# Patient Record
Sex: MALE | Race: OTHER | NOT HISPANIC OR LATINO | URBAN - METROPOLITAN AREA
[De-identification: names, ages, dates, MRNs, and addresses within clinical notes are randomized per-mention and may not be internally consistent; named-entity substitution may affect disease eponyms.]

---

## 2018-05-30 ENCOUNTER — EMERGENCY (EMERGENCY)
Facility: HOSPITAL | Age: 43
LOS: 1 days | Discharge: HOME | End: 2018-05-30
Attending: EMERGENCY MEDICINE | Admitting: EMERGENCY MEDICINE
Payer: SELF-PAY

## 2018-05-30 VITALS
OXYGEN SATURATION: 97 % | RESPIRATION RATE: 18 BRPM | DIASTOLIC BLOOD PRESSURE: 85 MMHG | HEART RATE: 79 BPM | TEMPERATURE: 99 F | SYSTOLIC BLOOD PRESSURE: 126 MMHG

## 2018-05-30 VITALS
SYSTOLIC BLOOD PRESSURE: 127 MMHG | RESPIRATION RATE: 16 BRPM | DIASTOLIC BLOOD PRESSURE: 84 MMHG | OXYGEN SATURATION: 98 % | HEART RATE: 51 BPM

## 2018-05-30 DIAGNOSIS — R07.89 OTHER CHEST PAIN: ICD-10-CM

## 2018-05-30 DIAGNOSIS — R07.9 CHEST PAIN, UNSPECIFIED: ICD-10-CM

## 2018-05-30 LAB
ALBUMIN SERPL ELPH-MCNC: 3.9 G/DL — SIGNIFICANT CHANGE UP (ref 3.4–5)
ALP SERPL-CCNC: 71 U/L — SIGNIFICANT CHANGE UP (ref 40–120)
ALT FLD-CCNC: 39 U/L — SIGNIFICANT CHANGE UP (ref 12–42)
ANION GAP SERPL CALC-SCNC: 9 MMOL/L — SIGNIFICANT CHANGE UP (ref 9–16)
AST SERPL-CCNC: 32 U/L — SIGNIFICANT CHANGE UP (ref 15–37)
BILIRUB SERPL-MCNC: 0.3 MG/DL — SIGNIFICANT CHANGE UP (ref 0.2–1.2)
BUN SERPL-MCNC: 11 MG/DL — SIGNIFICANT CHANGE UP (ref 7–23)
CALCIUM SERPL-MCNC: 9.3 MG/DL — SIGNIFICANT CHANGE UP (ref 8.5–10.5)
CHLORIDE SERPL-SCNC: 104 MMOL/L — SIGNIFICANT CHANGE UP (ref 96–108)
CO2 SERPL-SCNC: 27 MMOL/L — SIGNIFICANT CHANGE UP (ref 22–31)
CREAT SERPL-MCNC: 0.89 MG/DL — SIGNIFICANT CHANGE UP (ref 0.5–1.3)
GLUCOSE SERPL-MCNC: 105 MG/DL — HIGH (ref 70–99)
HCT VFR BLD CALC: 44.9 % — SIGNIFICANT CHANGE UP (ref 39–50)
HGB BLD-MCNC: 15.5 G/DL — SIGNIFICANT CHANGE UP (ref 13–17)
MCHC RBC-ENTMCNC: 30.5 PG — SIGNIFICANT CHANGE UP (ref 27–34)
MCHC RBC-ENTMCNC: 34.5 G/DL — SIGNIFICANT CHANGE UP (ref 32–36)
MCV RBC AUTO: 88.2 FL — SIGNIFICANT CHANGE UP (ref 80–100)
PLATELET # BLD AUTO: 258 K/UL — SIGNIFICANT CHANGE UP (ref 150–400)
POTASSIUM SERPL-MCNC: 4.1 MMOL/L — SIGNIFICANT CHANGE UP (ref 3.5–5.3)
POTASSIUM SERPL-SCNC: 4.1 MMOL/L — SIGNIFICANT CHANGE UP (ref 3.5–5.3)
PROT SERPL-MCNC: 7.9 G/DL — SIGNIFICANT CHANGE UP (ref 6.4–8.2)
RBC # BLD: 5.09 M/UL — SIGNIFICANT CHANGE UP (ref 4.2–5.8)
RBC # FLD: 12 % — SIGNIFICANT CHANGE UP (ref 10.3–16.9)
SODIUM SERPL-SCNC: 140 MMOL/L — SIGNIFICANT CHANGE UP (ref 132–145)
TROPONIN I SERPL-MCNC: <0.017 NG/ML — LOW (ref 0.02–0.06)
TROPONIN I SERPL-MCNC: <0.017 NG/ML — LOW (ref 0.02–0.06)
WBC # BLD: 8.8 K/UL — SIGNIFICANT CHANGE UP (ref 3.8–10.5)
WBC # FLD AUTO: 8.8 K/UL — SIGNIFICANT CHANGE UP (ref 3.8–10.5)

## 2018-05-30 PROCEDURE — 99218: CPT | Mod: 25

## 2018-05-30 PROCEDURE — 71046 X-RAY EXAM CHEST 2 VIEWS: CPT | Mod: 26

## 2018-05-30 PROCEDURE — 93010 ELECTROCARDIOGRAM REPORT: CPT

## 2018-05-30 RX ORDER — ASPIRIN/CALCIUM CARB/MAGNESIUM 324 MG
325 TABLET ORAL ONCE
Qty: 0 | Refills: 0 | Status: COMPLETED | OUTPATIENT
Start: 2018-05-30 | End: 2018-05-30

## 2018-05-30 RX ADMIN — Medication 325 MILLIGRAM(S): at 19:26

## 2018-05-30 NOTE — ED CDU PROVIDER INITIAL DAY NOTE - DIAGNOSTIC INTERPRETATION
chest x-ray, 2 views: Cardiac silhouette, mediastinal and hilar contours wnl, no acute consolidation, infiltrate, effusion, or PTX. No bony abnormalities noted

## 2018-05-30 NOTE — ED PROVIDER NOTE - OBJECTIVE STATEMENT
43 y/o M presents to the ED with constant chest pain, radiating to left upper arm, since 3-4 days ago that started after he moved furniture. Describes pain as dull throbbing, 2/10. No exacerbating or alleviating factors. He states he normally exercises with no chest pain. Took a baby aspirin yesterday. Denies SOB, nausea, vomiting, abd pain, or diaphoresis. He states he is under a lot of stress recently.

## 2018-05-30 NOTE — ED CDU PROVIDER INITIAL DAY NOTE - OBJECTIVE STATEMENT
41 y/o M presents to the ED with constant chest pain, radiating to left upper arm, since 3-4 days ago that started after he moved furniture. Describes pain as dull throbbing, 2/10. No exacerbating or alleviating factors. He states he normally exercises with no chest pain. Took a baby aspirin yesterday. Denies SOB, nausea, vomiting, abd pain, or diaphoresis. He states he is under a lot of stress recently.

## 2018-05-30 NOTE — ED ADULT NURSE NOTE - NS ED NOTE  TALK SOMEONE YN
CHIEF COMPLAINT:  Chest Tightness    SUBJECTIVE: Patient seen and examined with Dr. Adan, Blade, Pardeep and James on the Family Medicine Teaching Service.  Agree with history, physical, labs and plan which were reviewed in detail.    64 YO M with PMHx of HTN, presenting with "chest tightness".      Patient reports that he was shoveling snow yesterday with no discomfort, today he came home and took a nap.  When he woke up, he reports feeling strange and felt some chest tightness that felt unusual to him. Patient cannot specify how long the sensation lasted.  Graded as 1/10  cannot tell me what increases or decreases this discomfort.  Cannot tell me how long the sensation lasted. + associated mild SOB   No radiation , no palpitations and no diaphoresis  .  He recently had seen Dr. Seo for stress test that was abnormal last week ( persantine stress) and was supposed to see Dr. Seo on Monday to discuss the need for angio with Cath and stent placement.  Patient was advised to come to the ED if he had felt anything abnormal. Patient denies shortness of breath but does report URI symptoms since new years which have been improving.  Denies palpitations, jaw or arm pain/numbness, abdominal pain, headache.      In the ED, patient received ASA 325mg x1, Nitro Ointment x1.     2/12 - Patient states he is feeling much better, tightness has resolved.  Patient is comfortable, NAD.    2/13  Patient is s/p catheterization.  Doing well, minimal discomfort in right groin. No excessive bleeding. Patient did not receive any cardiac stents during the procedure.         Vital Signs Last 24 Hrs  T(C): 36.2, Max: 36.6 (02-12 @ 20:14)  T(F): 97.2, Max: 97.9 (02-12 @ 20:14)  HR: 65 (65 - 72)  BP: 124/80 (109/67 - 128/69)  BP(mean): --  RR: 18 (16 - 18)  SpO2: 96% (95% - 100%)    I&O's Summary      CAPILLARY BLOOD GLUCOSE        MEDICATIONS:  MEDICATIONS  (STANDING):  triamterene 37.5 mG/hydrochlorothiazide 25 mG Tablet 1Tablet(s) Oral daily  metoprolol 100milliGRAM(s) Oral two times a day  amLODIPine   Tablet 5milliGRAM(s) Oral daily  aspirin 325milliGRAM(s) Oral daily  clopidogrel Tablet 75milliGRAM(s) Oral daily  atorvastatin 80milliGRAM(s) Oral at bedtime      LABS: All Labs Reviewed:                        17.2   8.2   )-----------( 192      ( 13 Feb 2017 06:39 )             49.7     13 Feb 2017 06:39    141    |  104    |  15     ----------------------------<  92     4.2     |  31     |  0.98     Ca    8.9        13 Feb 2017 06:39  Phos  2.6       12 Feb 2017 08:21  Mg     2.3       12 Feb 2017 08:21    TPro  7.4    /  Alb  3.9    /  TBili  1.0    /  DBili  x      /  AST  49     /  ALT  61     /  AlkPhos  56     12 Feb 2017 08:21    PT/INR - ( 11 Feb 2017 21:50 )   PT: 13.0 sec;   INR: 1.18 ratio         PTT - ( 11 Feb 2017 21:50 )  PTT:32.5 sec  CARDIAC MARKERS ( 12 Feb 2017 08:21 )  <0.015 ng/mL / x     / 465 U/L / x     / x      CARDIAC MARKERS ( 12 Feb 2017 01:24 )  <0.015 ng/mL / x     / 633 U/L / x     / x      CARDIAC MARKERS ( 11 Feb 2017 21:50 )  <0.015 ng/mL / x     / 767 U/L / x     / x         Impression     Diagnostic Conclusions     Single vessel CAD as described    LCx: Diffuse irregularity.There is a small OM1 with a 75% ostial stenosis     Recommendations     Aggressive medical management of coronary artery disease and its   underlying risk factors.   Manage with medical therapy.    Estimated Blood Loss:7ml.      Blood Culture:     RADIOLOGY/EKG:
CHIEF COMPLAINT: Chest Tightness    SUBJECTIVE: Patient seen and examined with Dr. Adan and James on the Family Medicine Teaching Service.  Agree with history, physical, labs and plan which were reviewed in detail.     62 YO M with PMHx of HTN, presenting with "chest tightness".      Patient reports that he was shoveling snow yesterday with no discomfort, today he came home and took a nap.  When he woke up, he reports feeling strange and felt some chest tightness that felt unusual to him. Patient cannot specify how long the sensation lasted.  Graded as 1/10  cannot tell me what increases or decreases this discomfort.  Cannot tell me how long the sensation lasted. + associated mild SOB   No radiation , no palpitations and no diaphoresis  .  He recently had seen Dr. Seo for stress test that was abnormal last week ( persantine stress) and was supposed to see Dr. Seo on Monday to discuss the need for angio with Cath and stent placement.  Patient was advised to come to the ED if he had felt anything abnormal. Patient denies shortness of breath but does report URI symptoms since new years which have been improving.  Denies palpitations, jaw or arm pain/numbness, abdominal pain, headache.      In the ED, patient received ASA 325mg x1, Nitro Ointment x1.     2/12 - Patient states he is feeling much better, tightness has resolved.  Patient is comfortable, NAD.       Vital Signs Last 24 Hrs  T(C): 36.8, Max: 36.8 (02-12 @ 03:58)  T(F): 98.2, Max: 98.2 (02-12 @ 03:58)  HR: 71 (67 - 81)  BP: 111/76 (107/77 - 145/97)  BP(mean): --  RR: 17 (16 - 18)  SpO2: 95% (95% - 100%)    I&O's Summary      CAPILLARY BLOOD GLUCOSE        MEDICATIONS:  MEDICATIONS  (STANDING):  triamterene 37.5 mG/hydrochlorothiazide 25 mG Tablet 1Tablet(s) Oral daily  metoprolol 100milliGRAM(s) Oral two times a day  amLODIPine   Tablet 5milliGRAM(s) Oral daily  aspirin 325milliGRAM(s) Oral daily  clopidogrel Tablet 75milliGRAM(s) Oral daily  atorvastatin 80milliGRAM(s) Oral at bedtime      LABS: All Labs Reviewed:                        16.7   9.3   )-----------( 222      ( 11 Feb 2017 21:50 )             47.1     12 Feb 2017 08:21    142    |  104    |  14     ----------------------------<  84     3.5     |  29     |  1.06     Ca    8.9        12 Feb 2017 08:21  Phos  2.6       12 Feb 2017 08:21  Mg     2.3       12 Feb 2017 08:21    TPro  7.4    /  Alb  3.9    /  TBili  1.0    /  DBili  x      /  AST  49     /  ALT  61     /  AlkPhos  56     12 Feb 2017 08:21    PT/INR - ( 11 Feb 2017 21:50 )   PT: 13.0 sec;   INR: 1.18 ratio         PTT - ( 11 Feb 2017 21:50 )  PTT:32.5 sec  CARDIAC MARKERS ( 12 Feb 2017 08:21 )  <0.015 ng/mL / x     / 465 U/L / x     / x      CARDIAC MARKERS ( 12 Feb 2017 01:24 )  <0.015 ng/mL / x     / 633 U/L / x     / x      CARDIAC MARKERS ( 11 Feb 2017 21:50 )  <0.015 ng/mL / x     / 767 U/L / x     / x          Blood Culture:     RADIOLOGY/EKG:
Chief Complaint:    HPI:  62 y/o M with PMHx of HTN, presenting with "chest tightness". Patient reports that he was shoveling snow Friday with no discomfort.  When he woke up, he reports feeling strange and felt some chest tightness that felt unusual to him. Patient cannot specify how long the sensation lasted.  Graded as 1/10  cannot tell me what increases or decreases this discomfort.  Cannot tell me how long the sensation lasted. + associated mild SOB   No radiation , no palpitations and no diaphoresis  He recently had seen Dr. Seo for stress test that was abnormal last week ( persantine stress) and was supposed to see Dr. Seo on Monday to discuss the need for angio with Cath and stent placement.  Patient was advised to come to the ED if he had felt anything abnormal. Patient denies shortness of breath but does report URI symptoms since new years which have been improving.  Denies palpitations, jaw or arm pain/numbness, abdominal pain, headache.      In the ED, patient received ASA 325mg x1, Nitro Ointment x1.     pt seen and examined bedside. offers no complaints and states he feels well.       REVIEW OF SYSTEMS:    CONSTITUTIONAL: No weakness, fevers or chills  EYES/ENT: No visual changes;  No vertigo or throat pain   NECK: No pain or stiffness  RESPIRATORY: No cough, wheezing, hemoptysis; No shortness of breath  CARDIOVASCULAR: No chest pain or palpitations  GASTROINTESTINAL: No abdominal or epigastric pain. No nausea, vomiting, or hematemesis; No diarrhea or constipation. No melena or hematochezia.  GENITOURINARY: No dysuria, frequency or hematuria  NEUROLOGICAL: No numbness or weakness  SKIN: No itching, burning, rashes, or lesions   All other review of systems is negative unless indicated above    Vital Signs Last 24 Hrs  T(C): 36.8, Max: 36.8 (02-12 @ 03:58)  T(F): 98.2, Max: 98.2 (02-12 @ 03:58)  HR: 71 (67 - 81)  BP: 111/76 (107/77 - 145/97)  BP(mean): --  RR: 17 (16 - 18)  SpO2: 95% (95% - 100%)      PHYSICAL EXAM:    Constitutional: NAD, awake and alert, well-developed  HEENT: PERR, EOMI, Normal Hearing, MMM  Neck: Soft and supple, No LAD, No JVD  Respiratory: Breath sounds are clear bilaterally, No wheezing, rales or rhonchi  Cardiovascular: S1 and S2, regular rate and rhythm, no Murmurs, gallops or rubs  Gastrointestinal: Bowel Sounds present, soft, nontender, nondistended, no guarding, no rebound  Extremities: No peripheral edema  Vascular: 2+ peripheral pulses  Neurological: A/O x 3, no focal deficits  Musculoskeletal: 5/5 strength b/l upper and lower extremities  Skin: No rashes    Medications:  MEDICATIONS  (STANDING):  triamterene 37.5 mG/hydrochlorothiazide 25 mG Tablet 1Tablet(s) Oral daily  metoprolol 100milliGRAM(s) Oral two times a day  amLODIPine   Tablet 5milliGRAM(s) Oral daily  aspirin 325milliGRAM(s) Oral daily  clopidogrel Tablet 75milliGRAM(s) Oral daily  atorvastatin 80milliGRAM(s) Oral at bedtime    MEDICATIONS  (PRN):      Labs: All Labs Reviewed:                        16.7   9.3   )-----------( 222      ( 11 Feb 2017 21:50 )             47.1     12 Feb 2017 08:21    142    |  104    |  14     ----------------------------<  84     3.5     |  29     |  1.06     Ca    8.9        12 Feb 2017 08:21  Phos  2.6       12 Feb 2017 08:21  Mg     2.3       12 Feb 2017 08:21    TPro  7.4    /  Alb  3.9    /  TBili  1.0    /  DBili  x      /  AST  49     /  ALT  61     /  AlkPhos  56     12 Feb 2017 08:21    PT/INR - ( 11 Feb 2017 21:50 )   PT: 13.0 sec;   INR: 1.18 ratio         PTT - ( 11 Feb 2017 21:50 )  PTT:32.5 sec  CARDIAC MARKERS ( 12 Feb 2017 08:21 )  <0.015 ng/mL / x     / 465 U/L / x     / x      CARDIAC MARKERS ( 12 Feb 2017 01:24 )  <0.015 ng/mL / x     / 633 U/L / x     / x      CARDIAC MARKERS ( 11 Feb 2017 21:50 )  <0.015 ng/mL / x     / 767 U/L / x     / x
Patient is a 63y old  Male who presents with a chief complaint of chest tightness while shoveling snow (2017 05:25)      HPI:  62 YO M with PMHx of HTN, presenting with "chest tightness".      Patient reports that he was shoveling snow the day before with no discomfort, and he came home and took a nap.  When he woke up, he reports feeling strange and felt some chest tightness that felt unusual to him. Patient cannot specify how long the sensation lasted.  Graded as 1/10  cannot tell me what increases or decreases this discomfort.  Cannot tell me how long the sensation lasted. + associated mild SOB   No radiation , no palpitations and no diaphoresis  .  He recently had seen Dr. Seo for stress test that was abnormal last week ( persantine stress) and was supposed to see Dr. Seo on Monday to discuss the need for angio with Cath and stent placement.  Patient was advised to come to the ED if he had felt anything abnormal. Patient denies shortness of breath but does report URI symptoms since new years which have been improving.  Denies palpitations, jaw or arm pain/numbness, abdominal pain, headache.      In the ED, patient received ASA 325mg x1, Nitro Ointment x1.      - Pt denies any recurrence of his symptoms.      PAST MEDICAL & SURGICAL HISTORY:  Diverticulitis  HTN (hypertension)  History of inguinal hernia repair  S/P partial colectomy: for diverticulitis  History of cholecystectomy          MEDICATIONS  (STANDING):  triamterene 37.5 mG/hydrochlorothiazide 25 mG Tablet 1Tablet(s) Oral daily  metoprolol 100milliGRAM(s) Oral two times a day  amLODIPine   Tablet 5milliGRAM(s) Oral daily  aspirin 325milliGRAM(s) Oral daily  clopidogrel Tablet 75milliGRAM(s) Oral daily  atorvastatin 80milliGRAM(s) Oral at bedtime    MEDICATIONS  (PRN):      FAMILY HISTORY:  Family history of diabetes mellitus in father (Father)  Family history of cardiac disorder in father (Father):  age 58      SOCIAL HISTORY:  quit smoking many yrs ago    CIGARETTES:    ALCOHOL:    REVIEW OF SYSTEMS:  CONSTITUTIONAL:  No night sweats.  No fatigue, malaise, lethargy.  No fever or chills.  HEENT:  Eyes:  No visual changes.  No eye pain.  No eye discharge.    ENT:  No runny nose.  No epistaxis.  No sinus pain.  No sore throat.  No odynophagia.  No ear pain.  No congestion.  RESPIRATORY:  No cough.  No wheeze.  No hemoptysis.  No shortness of breath.  CARDIOVASCULAR:  No chest pains.  No palpitations. No shortness of breath, orthopnea or PND.  GASTROINTESTINAL:  No abdominal pain.  No nausea or vomiting.  No diarrhea or constipation.  No hematemesis.  No hematochezia.  No melena.  GENITOURINARY:  No urgency.  No frequency.  No dysuria.  No hematuria.  No obstructive symptoms.  No discharge.  No pain.  No significant abnormal bleeding.  MUSCULOSKELETAL:  No musculoskeletal pain.  No joint swelling.  No arthritis.  NEUROLOGICAL:  No tingling or numbness or weakness.  PSYCHIATRIC:  No confusion  SKIN:  No rashes.  No lesions.  No wounds.  ENDOCRINE:  No unexplained weight loss.  No polydipsia.  No polyuria.  No polyphagia.  HEMATOLOGIC:  No anemia.  No purpura.  No petechiae.  No prolonged or excessive bleeding.   ALLERGIC AND IMMUNOLOGIC:  No pruritus.  No swelling.         Vital Signs Last 24 Hrs  T(C): 36.2, Max: 36.8 (02-12 @ 03:58)  T(F): 97.2, Max: 98.2 (02-12 @ 03:58)  HR: 70 (67 - 81)  BP: 130/91 (107/77 - 145/97)  BP(mean): --  RR: 16 (16 - 18)  SpO2: 96% (96% - 100%)    PHYSICAL EXAM-    Constitutional: The patient appears to be normal, well developed, well nourished and alert and oriented to time, place and person. The patient does not appear acutely ill. The patient is alert.     Head: Head is normocephalic and atraumatic.      Neck: The patient's neck is supple without enlargement, has no palpable thyromegaly nor thyroid nodules and has no jugular venous distention. No audible carotid bruits. There are strong carotid pulses bilaterally. No JVD.     Cardiovascular: Regular rate and rhythm without S3, S4. No murmurs or rubs are appreciated.      Respiratory: The breath sounds in the left lung field are diminished through out. The breath sounds in the right lung field are diminished through out. The patient has no rales and no rhonchi. The patient has no wheezes.     Abdomen: Soft, nontender, nondistended with positive bowel sounds.      Extremity: No tenderness. There is no pitting edema, skin discoloration, clubbing and cyanosis.     Neurologic: The patient is alert and oriented.      Skin: No rash, no obvious lesions noted.      Psychiatric: The patient appears to be emotionally stable.      INTERPRETATION OF TELEMETRY:    ECG:  sinus rythm, L axis, no st t changes, normal intervals.  I&O's Detail      LABS:                        16.7   9.3   )-----------( 222      ( 2017 21:50 )             47.1     2017 08:21    142    |  104    |  14     ----------------------------<  84     3.5     |  29     |  1.06     Ca    8.9        2017 08:21  Phos  2.6       2017 08:21  Mg     2.3       2017 08:21    TPro  7.4    /  Alb  3.9    /  TBili  1.0    /  DBili  x      /  AST  49     /  ALT  61     /  AlkPhos  56     2017 08:21    CARDIAC MARKERS ( 2017 08:21 )  <0.015 ng/mL / x     / 465 U/L / x     / x      CARDIAC MARKERS ( 2017 01:24 )  <0.015 ng/mL / x     / 633 U/L / x     / x      CARDIAC MARKERS ( 2017 21:50 )  <0.015 ng/mL / x     / 767 U/L / x     / x          PT/INR - ( 2017 21:50 )   PT: 13.0 sec;   INR: 1.18 ratio         PTT - ( 2017 21:50 )  PTT:32.5 sec    I&O's Summary    BNPSerum Pro-Brain Natriuretic Peptide: 169 pg/mL ( @ 21:50)    RADIOLOGY & ADDITIONAL STUDIES:
No

## 2024-06-07 NOTE — ED CDU PROVIDER INITIAL DAY NOTE - SKIN, MLM
Results sent to patient portal    Tisha,    Urinalysis with some protein, let's repeat urinalysis at a nonfasting lab visit, all other parameters without concerns.     Lipids: Cholesterol and triglycerides are elevated, all other parameters without concerns. We can do a heart scan that is $49 through [facility], this can look at the heart arteries and see if there is any plaque or calcification. Please let me know if you would like to have this test ordered. Also, it would be advisable to start a cholesterol lowering due to intermediate risk, please let me know your thoughts.    Cardiovascular Prevention    Your risk of a heart attack or stroke within 10 years is intermediate risk at 7.5%.    - Starting a statin lowers your risk to.........5.6%.   - Continue with healthy diet, exercise and/or sleep to improve blood pressure, cholesterol and blood sugar    A1C, CBC, TSH studies, and CMP without concerns    Donna SHUKLA
Skin normal, no rashes
0 (no pain/absence of nonverbal indicators of pain)